# Patient Record
Sex: MALE | Race: WHITE
[De-identification: names, ages, dates, MRNs, and addresses within clinical notes are randomized per-mention and may not be internally consistent; named-entity substitution may affect disease eponyms.]

---

## 2019-07-16 ENCOUNTER — HOSPITAL ENCOUNTER (OUTPATIENT)
Dept: HOSPITAL 75 - RAD FS | Age: 19
End: 2019-07-16
Attending: NURSE PRACTITIONER
Payer: COMMERCIAL

## 2019-07-16 DIAGNOSIS — S79.912A: Primary | ICD-10-CM

## 2019-07-16 PROCEDURE — 73502 X-RAY EXAM HIP UNI 2-3 VIEWS: CPT

## 2019-07-16 NOTE — DIAGNOSTIC IMAGING REPORT
Left hip at 1051 hours.



INDICATION: Injury, hip pain.



AP and lateral views were obtained.



COMPARISON: There are no prior studies available for comparison.



FINDINGS: There is no fracture, dislocation or acute bony

abnormality evident. The hip joint is well-maintained as is the

left sacroiliac joint. The soft tissues are unremarkable.



IMPRESSION:

1. There is no evidence for acute bony abnormality.

2. If clinical concern regarding an underlying abnormality

persists, then MRI would be recommended for further study.



Dictated by: 



  Dictated on workstation # [id number]

## 2019-07-29 ENCOUNTER — HOSPITAL ENCOUNTER (OUTPATIENT)
Dept: HOSPITAL 75 - RAD | Age: 19
End: 2019-07-29
Attending: NURSE PRACTITIONER
Payer: COMMERCIAL

## 2019-07-29 VITALS — BODY MASS INDEX: 27.09 KG/M2 | WEIGHT: 200 LBS | HEIGHT: 72 IN

## 2019-07-29 DIAGNOSIS — M25.852: ICD-10-CM

## 2019-07-29 DIAGNOSIS — Q76.49: ICD-10-CM

## 2019-07-29 DIAGNOSIS — M24.152: ICD-10-CM

## 2019-07-29 DIAGNOSIS — S79.912A: Primary | ICD-10-CM

## 2019-07-29 PROCEDURE — 73722 MRI JOINT OF LWR EXTR W/DYE: CPT

## 2019-07-29 PROCEDURE — 27093 INJECTION FOR HIP X-RAY: CPT

## 2019-07-29 PROCEDURE — 73525 CONTRAST X-RAY OF HIP: CPT

## 2019-07-29 NOTE — DIAGNOSTIC IMAGING REPORT
INDICATION: Left hip pain.



FINDINGS: Patient was brought to the procedure room, placed on

table in the supine position. Skin of the left hip was prepped

and draped in the usual sterile fashion. Small amount of 1%

lidocaine was utilized for local anesthesia. 20-gauge needle was

advanced into the left hip, placed with the tip at the femoral

head neck junction laterally. 15 mL solution of iodinated

contrast, normal saline, and gadolinium was injected under

fluoroscopic observation. Needle was withdrawn and hemostasis was

obtained. Patient tolerated the procedure well and was sent to

MRI in satisfactory condition. 34 seconds of fluoroscopy was

utilized.



IMPRESSION: Successful left hip injection of gadolinium contrast

solution, using fluoroscopy.



Dictated by: 



  Dictated on workstation # [id number]

## 2019-07-29 NOTE — DIAGNOSTIC IMAGING REPORT
EXAMINATION: 

Magnetic resonance imaging of the pelvis and left hip with

intra-articular contrast. 



DATE: 

July 29, 2019.



COMPARISON: 

Left hip arthrogram of July 29, 2019. 

Left hip radiographs of July 16, 2019.



INDICATION: 

18-year-old male, left hip injury 2 weeks ago. Left hip pain.



TECHNIQUE: 

Magnetic Resonance Imaging sequences were performed of the pelvis

and left hip following the intra-articular administration of

contrast. 



FINDINGS:



TENDONS AND MUSCLES: 

The gluteus chris muscles and their origins and insertions are

intact bilaterally.



The tendons and muscles of the greater trochanter (gluteus

minimus, piriformis, and gluteus medius) are intact bilaterally.



Both common hamstring attachments on the ischial tuberosities are

intact and the extensor muscles of the thigh are intact.



The visualized portions of the flexors and adductor muscles of

the thigh and their attachments on the pelvis and hips are

intact.



Both iliopsoas and iliacus muscles are intact.  The bilateral

iliopsoas tendons are intact.



HIPS AND SACROILIAC JOINTS: 

The contours of the femoral heads and acetabuli are smooth and

symmetric. There is minimal irregularity of the left hip

anterosuperior labrum. There is no paralabral cyst. The articular

cartilage is grossly intact. There is no intra-articular body or

prominent synovitis. The left hip alpha angle is within normal

limits at 42 degrees.



There is limited evaluation of the right hip for assessment of

labral tear. There is no obvious right paralabral cyst. There is

no right hip joint effusion.



The sacroiliac joints are grossly unremarkable in appearance on

limited evaluation.



LUMBAR SPINE: 

There is transitional lumbosacral anatomy. L5 is labeled as

having an enlarged right lateral mass which has a

pseudoarticulation with S1. 



BONE: 

The bone marrow signal is within normal limits. Specifically,

negative for fracture, osteomyelitis, osteonecrosis, or marrow

replacing process.



BURSAE AND SOFT TISSUES: 

The bursae and soft tissues surrounding the pelvis and hips are

within normal limits.



IMPRESSION: 

1. Minimal irregularity of the left hip anterosuperior labrum. No

paralabral cyst. Normal alpha angle of the left hip.

2. Intact muscles and tendons.

3. No acute fracture, bone contusion, or other bone marrow signal

abnormality.

4. Transitional lumbosacral anatomy. If spinal intervention is to

be performed in the future, recommend careful correlation with

levels.



Dictated by: 



  Dictated on workstation # [id number]

## 2019-10-15 ENCOUNTER — HOSPITAL ENCOUNTER (OUTPATIENT)
Dept: HOSPITAL 75 - RAD FS | Age: 19
End: 2019-10-15
Attending: NURSE PRACTITIONER
Payer: COMMERCIAL
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
## 2019-10-15 DIAGNOSIS — S89.91XA: Primary | ICD-10-CM

## 2019-10-15 PROCEDURE — 73562 X-RAY EXAM OF KNEE 3: CPT

## 2019-10-15 NOTE — DIAGNOSTIC IMAGING REPORT
Clinical indications: Football injury one and a half weeks ago.

Patient has pain, cannot fully straighten knee.



Exam: X-ray of the right knee, 3 views.



Comparison: None.



Findings:

There is no acute fracture or dislocation. There is no knee

effusion. There is no significant bone or joint abnormality.



Impression:

There is no acute fracture or dislocation.



Dictated by: 



  Dictated on workstation # [id number]